# Patient Record
Sex: MALE | Race: WHITE | NOT HISPANIC OR LATINO | Employment: STUDENT | URBAN - NONMETROPOLITAN AREA
[De-identification: names, ages, dates, MRNs, and addresses within clinical notes are randomized per-mention and may not be internally consistent; named-entity substitution may affect disease eponyms.]

---
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
## 2025-03-11 ENCOUNTER — APPOINTMENT (OUTPATIENT)
Age: 20
End: 2025-03-11
Payer: COMMERCIAL

## 2025-03-11 VITALS
BODY MASS INDEX: 29.81 KG/M2 | OXYGEN SATURATION: 96 % | HEIGHT: 70 IN | DIASTOLIC BLOOD PRESSURE: 82 MMHG | HEART RATE: 85 BPM | WEIGHT: 208.2 LBS | SYSTOLIC BLOOD PRESSURE: 140 MMHG

## 2025-03-11 DIAGNOSIS — Z13.220 NEED FOR LIPID SCREENING: ICD-10-CM

## 2025-03-11 DIAGNOSIS — Z00.00 ANNUAL PHYSICAL EXAM: Primary | ICD-10-CM

## 2025-03-11 DIAGNOSIS — L70.0 CYSTIC ACNE VULGARIS: ICD-10-CM

## 2025-03-11 DIAGNOSIS — F41.9 ANXIETY: ICD-10-CM

## 2025-03-11 DIAGNOSIS — Z11.59 NEED FOR HEPATITIS C SCREENING TEST: ICD-10-CM

## 2025-03-11 DIAGNOSIS — Z23 NEEDS FLU SHOT: ICD-10-CM

## 2025-03-11 DIAGNOSIS — Z11.4 SCREENING FOR HIV (HUMAN IMMUNODEFICIENCY VIRUS): ICD-10-CM

## 2025-03-11 PROCEDURE — 90471 IMMUNIZATION ADMIN: CPT | Performed by: STUDENT IN AN ORGANIZED HEALTH CARE EDUCATION/TRAINING PROGRAM

## 2025-03-11 PROCEDURE — 90673 RIV3 VACCINE NO PRESERV IM: CPT | Performed by: STUDENT IN AN ORGANIZED HEALTH CARE EDUCATION/TRAINING PROGRAM

## 2025-03-11 PROCEDURE — 1036F TOBACCO NON-USER: CPT | Performed by: STUDENT IN AN ORGANIZED HEALTH CARE EDUCATION/TRAINING PROGRAM

## 2025-03-11 PROCEDURE — 99395 PREV VISIT EST AGE 18-39: CPT | Performed by: STUDENT IN AN ORGANIZED HEALTH CARE EDUCATION/TRAINING PROGRAM

## 2025-03-11 PROCEDURE — 3008F BODY MASS INDEX DOCD: CPT | Performed by: STUDENT IN AN ORGANIZED HEALTH CARE EDUCATION/TRAINING PROGRAM

## 2025-03-11 RX ORDER — ESCITALOPRAM OXALATE 10 MG/1
10 TABLET ORAL DAILY
Qty: 90 TABLET | Refills: 3 | Status: SHIPPED | OUTPATIENT
Start: 2025-03-11

## 2025-03-11 RX ORDER — CEFUROXIME AXETIL 250 MG/1
250 TABLET ORAL 2 TIMES DAILY
Qty: 60 TABLET | Refills: 0 | Status: SHIPPED | OUTPATIENT
Start: 2025-03-11 | End: 2025-04-10

## 2025-03-11 NOTE — PROGRESS NOTES
Subjective:  Mark Byrne is a 19 y.o. male who presents to clinic today for annual exam    Employment:  - What do you do for work or school? [employer] and [employer]   - How is school/work going? Both going really good    Social History:  - Who lives with you at home? Lives on campus    Medical History:   (Please complete in history tab)  - Any changes to your personal or family history? no  - Any personal/family history of the following:   Hypertension, heart attack, high cholesterol, stroke  Cancer of the colon, breast, prostate or skin  Addiction, alcoholism, mental health concerns   Diabetes, thyroid or autoimmune disease    PHQ2- 0    (Provider to ask):    Pillars of health:  - Any issues with sleep? no  - How do you feel about your eating habits? good  - What do you do to be physically active? Club basketball, run and workout And how often? 4/7 days  - Do you have any goals related to exercise or nutrition? Would like to cut some excess fat  - How is your stress level? good Manageable or is it a problem? manageable  - Any guns in the home? No    Sexual history (provider to ask):  - Have you been sexually active within the past year? Yes   - What are the genders of your sexual partner(s)? female  - Are you or your spouse/partner wanting to become pregnant or have children in the next year? No  - If no, are you currently using any method to prevent pregnancy:? Yes - condoms    - Do you have a personal history of sexually transmitted infections (STI)?: no  - Have you ever been tested for HIV? no  - Do you want comprehensive STI testing today? No    Domestic Violence Screening (Provider to ask):  Because violence is so common in many people's lives and because there is help available for those being abused, I now ask every patient about domestic violence:  - Within the past year have you been hit, slapped, kicked or otherwise physically hurt by someone? no  - Are you in a relationship  with a person who threatens or physically hurts you? no  - Has anyone forced you to have sexual activities that made you feel uncomfortable? No    Mood:  - mood has been good, has been more active within school which has been good  - joined a fraternity and lives on campus in [place]  - has a busy semester with 18 credits  - taking lexapro 10mg  - he continues to go to therapy at [id number]  - still has some OCD symptoms but improving    Acne:   Was put on meds by dermatology and acne improved dramatically on this  Did well after 8 months on acne again, acne came back on chest and forehead      Review of Systems    Assessment/Plan:  Mark Byrne is a 19 y.o. male  who presents to clinic today to address the following issues:   1. Annual physical exam        2. Anxiety  escitalopram (Lexapro) 10 mg tablet      3. Needs flu shot  Flu vaccine, trivalent, preservative free, no egg protein, age 18y+ (Flublok)      4. Need for lipid screening  Lipid Panel    Lipid Panel      5. Screening for HIV (human immunodeficiency virus)  HIV 1/2 Antigen/Antibody Screen with Reflex to Confirmation    HIV 1/2 Antigen/Antibody Screen with Reflex to Confirmation      6. Need for hepatitis C screening test  Hepatitis C antibody    Hepatitis C antibody      7. Cystic acne vulgaris  cefuroxime (Ceftin) 250 mg tablet      Annual physical exam within normal limits:  Patient obtained flu vaccination today and is recommended to get meningitis B vaccine at the pharmacy.    Anxiety:  Well-controlled, known to provider  Continue Lexapro 10 mg  Cystic acne:  Chronic problem, new to provider, requires further workup and management  Patient had significant success with Ceftin so we will send refill for him    No problem-specific Assessment & Plan notes found for this encounter.      Patient Instructions   Meningitis B vaccine - I believe you can get it at [facility], or the [facility] - try this    Routine adult  "health maintenance   It sounds as if things are going well. Keep up the good work!     Exercise:   - Try for at least 150 minutes of cardiovascular (strenuous) exercise per week.   Safety:   - Wear your seat belt at all times when in a car and NO TEXTING/CELL PHONES while driving.   - Wear a helmet while biking, using a motorcycle, skiing/snow boarding, skate/long boarding, or any activities faster than running.   - Avoid smoking.   - If you have a gun it needs to be locked up and stored unloaded away from children.   Nutrition:   - Drink plenty of water each day   - No more than 2 alcoholic drinks per day for men. No more than 1 alcoholic drink per day for woman.   - Read labels for calories   - Use website \"My Fitness Pal\" for free calorie counting tool when possible.   Stress:   - Make time for activities that allow you to decrease stress and recognize any red flags of stress for you to know when to activate your stress release mechanisms.   Sleep:   - Try to get 7-9 hours of sleep each night.   Preventative Care:   - Follow-up yearly for your annual exams   - For most people, the following are indicated:  - reproductive age females prenatal vitamin daily  - Colonoscopies for colon cancer screening starting at age 45, then every 3-10 years  - Mammograms for breast cancer screening every 1-2 years starting at age 40  - Annual flu vaccination   - Bone density screening at age 65   - Pneumonia vaccination at age 65 (some people need this before age 65, so ask your doctor!)  - Shingles vaccination (shingrix) at age 50     Follow up: 1 year or sooner as needed    Return precautions discussed.  An After Visit Summary was given to the patient.  All questions were answered and patient in agreement with plan.    Objective:  /82   Pulse 85   Ht 1.785 m (5' 10.26\")   Wt 94.4 kg (208 lb 3.2 oz)   SpO2 96%   BMI 29.65 kg/m²     Physical Exam  Vitals and nursing note reviewed.   Constitutional:       General: He is " "not in acute distress.     Appearance: Normal appearance. He is not ill-appearing.   HENT:      Head: Normocephalic and atraumatic.      Right Ear: Tympanic membrane, ear canal and external ear normal. There is no impacted cerumen.      Left Ear: Tympanic membrane, ear canal and external ear normal. There is no impacted cerumen.      Nose: Nose normal. No congestion.      Mouth/Throat:      Mouth: Mucous membranes are moist.      Pharynx: No oropharyngeal exudate or posterior oropharyngeal erythema.   Eyes:      General: No scleral icterus.        Right eye: No discharge.         Left eye: No discharge.      Extraocular Movements: Extraocular movements intact.      Conjunctiva/sclera: Conjunctivae normal.      Pupils: Pupils are equal, round, and reactive to light.   Cardiovascular:      Rate and Rhythm: Normal rate and regular rhythm.   Pulmonary:      Effort: Pulmonary effort is normal. No respiratory distress.      Breath sounds: Normal breath sounds. No wheezing.   Abdominal:      General: Bowel sounds are normal. There is no distension.      Palpations: Abdomen is soft.      Tenderness: There is no abdominal tenderness.   Musculoskeletal:      Cervical back: Normal range of motion. No tenderness.      Right lower leg: No edema.      Left lower leg: No edema.   Lymphadenopathy:      Cervical: No cervical adenopathy.   Skin:     General: Skin is warm and dry.      Comments: Acne across the hairline on face   Neurological:      General: No focal deficit present.      Mental Status: He is alert and oriented to person, place, and time.      Sensory: No sensory deficit.      Motor: No weakness.      Deep Tendon Reflexes: Reflexes normal.   Psychiatric:         Mood and Affect: Mood normal.         Behavior: Behavior normal.         Thought Content: Thought content normal.         Judgment: Judgment normal.         LABS:   No results found for: \"CHOL\", \"LDL\", \"HDL\", \"HGBA1C\"    The ASCVD Risk score (Shamokin DK, et al., " 2019) failed to calculate for the following reasons:    The 2019 ASCVD risk score is only valid for ages 40 to 79    IMAGES:   No new images     I spent 20 minutes in total time for this visit including all related clinical activities before, during, and after the visit excluding other billable activities/procedure time.     Luz Sebastian MD

## 2025-03-11 NOTE — LETTER
March 12, 2025     Patient: Mark Byrne   YOB: 2005   Date of Visit: 3/11/2025       To Whom It May Concern:    Mark Byrne was seen in my clinic on 3/11/2025 at 2:00 pm. Please excuse Mark for his absence from school on this day to make the appointment.    If you have any questions or concerns, please don't hesitate to call.         Sincerely,         Luz Sebastian MD        CC: No Recipients

## 2025-03-11 NOTE — PATIENT INSTRUCTIONS
"Meningitis B vaccine - I believe you can get it at [facility], or the [facility] - try this    Routine adult health maintenance   It sounds as if things are going well. Keep up the good work!     Exercise:   - Try for at least 150 minutes of cardiovascular (strenuous) exercise per week.   Safety:   - Wear your seat belt at all times when in a car and NO TEXTING/CELL PHONES while driving.   - Wear a helmet while biking, using a motorcycle, skiing/snow boarding, skate/long boarding, or any activities faster than running.   - Avoid smoking.   - If you have a gun it needs to be locked up and stored unloaded away from children.   Nutrition:   - Drink plenty of water each day   - No more than 2 alcoholic drinks per day for men. No more than 1 alcoholic drink per day for woman.   - Read labels for calories   - Use website \"My Fitness Pal\" for free calorie counting tool when possible.   Stress:   - Make time for activities that allow you to decrease stress and recognize any red flags of stress for you to know when to activate your stress release mechanisms.   Sleep:   - Try to get 7-9 hours of sleep each night.   Preventative Care:   - Follow-up yearly for your annual exams   - For most people, the following are indicated:  - reproductive age females prenatal vitamin daily  - Colonoscopies for colon cancer screening starting at age 45, then every 3-10 years  - Mammograms for breast cancer screening every 1-2 years starting at age 40  - Annual flu vaccination   - Bone density screening at age 65   - Pneumonia vaccination at age 65 (some people need this before age 65, so ask your doctor!)  - Shingles vaccination (shingrix) at age 50   "

## 2025-04-16 ENCOUNTER — PATIENT MESSAGE (OUTPATIENT)
Age: 20
End: 2025-04-16
Payer: COMMERCIAL

## 2025-04-17 DIAGNOSIS — L70.0 CYSTIC ACNE VULGARIS: ICD-10-CM

## 2025-04-17 RX ORDER — CEFUROXIME AXETIL 250 MG/1
250 TABLET ORAL 2 TIMES DAILY
Qty: 60 TABLET | Refills: 0 | Status: SHIPPED | OUTPATIENT
Start: 2025-04-17 | End: 2025-05-17

## 2025-06-05 DIAGNOSIS — L70.0 CYSTIC ACNE VULGARIS: ICD-10-CM

## 2025-06-11 RX ORDER — CEFUROXIME AXETIL 250 MG/1
250 TABLET ORAL 2 TIMES DAILY
Qty: 60 TABLET | Refills: 2 | Status: SHIPPED | OUTPATIENT
Start: 2025-06-11

## 2025-06-11 NOTE — TELEPHONE ENCOUNTER
"PATIENT CALLED REQUESTING A REFILL OF CEFUROXIME WITH \"MULTIPLE REFILLS\" SO HE DIDN'T HAVE TO CALL IN\" FOR REFILLS ALL THE TIME  "

## 2025-08-21 ENCOUNTER — OFFICE VISIT (OUTPATIENT)
Age: 20
End: 2025-08-21
Payer: COMMERCIAL

## 2025-08-21 VITALS
DIASTOLIC BLOOD PRESSURE: 72 MMHG | HEART RATE: 83 BPM | SYSTOLIC BLOOD PRESSURE: 102 MMHG | WEIGHT: 224.2 LBS | BODY MASS INDEX: 32.1 KG/M2 | OXYGEN SATURATION: 97 % | HEIGHT: 70 IN

## 2025-08-21 DIAGNOSIS — F41.9 ANXIETY: Primary | ICD-10-CM

## 2025-08-21 DIAGNOSIS — L70.0 CYSTIC ACNE VULGARIS: ICD-10-CM

## 2025-08-21 PROCEDURE — 99214 OFFICE O/P EST MOD 30 MIN: CPT | Performed by: STUDENT IN AN ORGANIZED HEALTH CARE EDUCATION/TRAINING PROGRAM

## 2025-08-21 PROCEDURE — 1036F TOBACCO NON-USER: CPT | Performed by: STUDENT IN AN ORGANIZED HEALTH CARE EDUCATION/TRAINING PROGRAM

## 2025-08-21 PROCEDURE — 3008F BODY MASS INDEX DOCD: CPT | Performed by: STUDENT IN AN ORGANIZED HEALTH CARE EDUCATION/TRAINING PROGRAM

## 2025-08-21 RX ORDER — ESCITALOPRAM OXALATE 5 MG/1
5 TABLET ORAL DAILY
Qty: 90 TABLET | Refills: 3 | Status: SHIPPED | OUTPATIENT
Start: 2025-08-21

## 2025-08-21 RX ORDER — CEFUROXIME AXETIL 250 MG/1
250 TABLET ORAL 2 TIMES DAILY
Qty: 60 TABLET | Refills: 2 | Status: SHIPPED | OUTPATIENT
Start: 2025-08-21

## 2025-08-21 ASSESSMENT — PATIENT HEALTH QUESTIONNAIRE - PHQ9
SUM OF ALL RESPONSES TO PHQ9 QUESTIONS 1 AND 2: 0
2. FEELING DOWN, DEPRESSED OR HOPELESS: NOT AT ALL
1. LITTLE INTEREST OR PLEASURE IN DOING THINGS: NOT AT ALL

## 2025-08-22 LAB
CHOLEST SERPL-MCNC: 160 MG/DL
CHOLEST/HDLC SERPL: 2.7 (CALC)
HCV AB SERPL QL IA: NORMAL
HDLC SERPL-MCNC: 59 MG/DL
HIV 1+2 AB+HIV1 P24 AG SERPL QL IA: NORMAL
HIV 1+2 AB+HIV1 P24 AG SERPL QL IA: NORMAL
LDLC SERPL CALC-MCNC: 84 MG/DL (CALC)
NONHDLC SERPL-MCNC: 101 MG/DL (CALC)
TRIGL SERPL-MCNC: 78 MG/DL

## 2026-03-12 ENCOUNTER — APPOINTMENT (OUTPATIENT)
Age: 21
End: 2026-03-12
Payer: COMMERCIAL

## 2026-08-24 ENCOUNTER — APPOINTMENT (OUTPATIENT)
Age: 21
End: 2026-08-24
Payer: COMMERCIAL